# Patient Record
(demographics unavailable — no encounter records)

---

## 2024-10-19 NOTE — HISTORY OF PRESENT ILLNESS
[Denies] : Denies [No] : No [Yes] : Yes [Declined] : Declined [Informed consent documented in EHR.] : Informed consent documented in EHR. [Left upper extremity] : Left upper extremity [24g] : 24g [Start Time: ___] : Medication Start Time: [unfilled] [End Time: ___] : Medication End Time: [unfilled] [Medication Name: ___] : Medication Name: [unfilled] [Total Amount Administered: ___] : Total Amount Administered: [unfilled] [IV discontinued. Intact. No signs or symptoms of IV complications noted. Time: ___] : IV discontinued. Intact. No signs or symptoms of IV complications noted. Time: [unfilled] [Patient  instructed to seek medical attention with signs and symptoms of adverse effects] : Patient  instructed to seek medical attention with signs and symptoms of adverse effects [Patient left unit in no acute distress] : Patient left unit in no acute distress [Medications administered as ordered and tolerated well.] : Medications administered as ordered and tolerated well. [Blood drawn at time of visit] : Blood drawn at time of visit [de-identified] : Patient is here for scheduled Benlysta infusion. Patient states she tolerated last infusion well. Today, patient denies any recent infection/ abx use. She denies any swelling, rashes, soreness, pain.  No  complaints at this time. Pre med solucortef IV administered. Patient refused oral Tylenol and Zyrtec. Medication administered as prescribed. Patient tolerated infusion well, discharged to home in [facility].

## 2024-12-13 NOTE — HISTORY OF PRESENT ILLNESS
[Denies] : Denies [No] : No [Yes] : Yes [Declined] : Declined [Left upper extremity] : Left upper extremity [24g] : 24g [Start Time: ___] : Medication Start Time: [unfilled] [End Time: ___] : Medication End Time: [unfilled] [Medication Name: ___] : Medication Name: [unfilled] [Total Amount Administered: ___] : Total Amount Administered: [unfilled] [IV discontinued. Intact. No signs or symptoms of IV complications noted. Time: ___] : IV discontinued. Intact. No signs or symptoms of IV complications noted. Time: [unfilled] [Patient  instructed to seek medical attention with signs and symptoms of adverse effects] : Patient  instructed to seek medical attention with signs and symptoms of adverse effects [Patient left unit in no acute distress] : Patient left unit in no acute distress [Medications administered as ordered and tolerated well.] : Medications administered as ordered and tolerated well. [Blood drawn at time of visit] : Blood drawn at time of visit [de-identified] : left medial  [de-identified] : labs drawn as per order [de-identified] : Patient presents for Benlysta infusion, doing well overall. Patient denies hospitalizations or any recent infections. Patient reports having fatigue, other than that, patient denies pain, swelling, rashes, sores, and morning stiffness. Patient denies any mood changes or signs of depression. Patient refused Tylenol and Zyrtec, premedicated with Hydrocortisone only. Patient tolerated infusion well and left the unit in [place].

## 2024-12-23 NOTE — HISTORY OF PRESENT ILLNESS
[FreeTextEntry1] : Primary rheumatologist: Dr. Ramachandran.  MARIEL MACKAY presents for a follow up visit today.  last office visit 3/2024  ROS: painful ulcers in the nose no oral ulcers no hair loss  no joint pain, swelling or stiffness.   Prior history  SLE on Benlysta (LATOYA, dsDNA, SIGRID, hypocomplementemia)  The following labs reviewed CBC: Hb 10.7 stable; WBC 3.37 (intermittent, stable overall); plts wnl CMP wnl Prot/Cr: normal  dsDNA wnl UA negative

## 2024-12-23 NOTE — ASSESSMENT
[FreeTextEntry1] : HCM: declines influenza vaccine  labs are stable  continue with Benlysta infusions f/u with ENT to evaluate nasal ulcers saline gel to nares for now.   OV 3 months, sooner PRN   My collective time spent on today's visit was greater than 35 minutes and included: Preparation for the visit, review of the medical records, review of pertinent diagnostic studies, examination and counseling of the patient on the above diagnosis, treatment plan and prognosis, orders of diagnostic test, medications and or appropriate procedures and documentation in the medical record of today's visit.

## 2024-12-23 NOTE — PHYSICAL EXAM
[General Appearance - Alert] : alert [General Appearance - In No Acute Distress] : in no acute distress [Auscultation Breath Sounds / Voice Sounds] : lungs were clear to auscultation bilaterally [Heart Sounds] : normal S1 and S2 [Murmurs] : no murmurs [Full Pulse] : the pedal pulses are present [Edema] : there was no peripheral edema [] : no rash [Abnormal Walk] : normal gait [Nail Clubbing] : no clubbing  or cyanosis of the fingernails [Musculoskeletal - Swelling] : no joint swelling seen [Motor Tone] : muscle strength and tone were normal [FreeTextEntry1] : nasal ulcer, chronic

## 2025-01-17 NOTE — HISTORY OF PRESENT ILLNESS
[Denies] : Denies [No] : No [Yes] : Yes [Informed consent documented in EHR.] : Informed consent documented in EHR. [24g] : 24g [Start Time: ___] : Medication Start Time: [unfilled] [End Time: ___] : Medication End Time: [unfilled] [Medication Name: ___] : Medication Name: [unfilled] [Total Amount Administered: ___] : Total Amount Administered: [unfilled] [IV discontinued. Intact. No signs or symptoms of IV complications noted. Time: ___] : IV discontinued. Intact. No signs or symptoms of IV complications noted. Time: [unfilled] [Patient  instructed to seek medical attention with signs and symptoms of adverse effects] : Patient  instructed to seek medical attention with signs and symptoms of adverse effects [Patient left unit in no acute distress] : Patient left unit in no acute distress [Medications administered as ordered and tolerated well.] : Medications administered as ordered and tolerated well. [de-identified] : left arm median cubital vein  [de-identified] : no labs  [de-identified] : Patient presents for scheduled Benlysta infusion, ambulatory in [facility]. Today, patient continues to reports having fatigue, otherwise, reports doing well and denies pain, swelling, rashes, sores, and no joints stiffness. Patient denies any mood changes or signs of depression. Patient premedicated with Hydrocortisone only, and infusion tolerated well.

## 2025-03-28 NOTE — HISTORY OF PRESENT ILLNESS
[Denies] : Denies [No] : No [Yes] : Yes [Declined] : Declined [Left upper extremity] : Left upper extremity [24g] : 24g [Start Time: ___] : Medication Start Time: [unfilled] [End Time: ___] : Medication End Time: [unfilled] [Medication Name: ___] : Medication Name: [unfilled] [Total Amount Administered: ___] : Total Amount Administered: [unfilled] [IV discontinued. Intact. No signs or symptoms of IV complications noted. Time: ___] : IV discontinued. Intact. No signs or symptoms of IV complications noted. Time: [unfilled] [Patient  instructed to seek medical attention with signs and symptoms of adverse effects] : Patient  instructed to seek medical attention with signs and symptoms of adverse effects [Patient left unit in no acute distress] : Patient left unit in no acute distress [Medications administered as ordered and tolerated well.] : Medications administered as ordered and tolerated well. [Blood drawn at time of visit] : Blood drawn at time of visit [de-identified] : left medial [de-identified] : Labs drawn as per ordered by MD  [de-identified] : Patient presents for Benlysta infusion, doing well overall. Patient denies any recent infection, antibiotic use or hospitalizations. Patient denies any pain, swelling or stiffness. Patient denies any rashes or lesions to the skin. Patient reports experiencing a reoccurring scab in her nose, patient reports has to f/u with ENT. No other symptoms or concerns noted at this time. Patient pre-medicated and tolerated infusion well.

## 2025-04-22 NOTE — ASSESSMENT
[FreeTextEntry1] : Prior history  SLE on Benlysta (LATOYA, dsDNA, SIGRID, hypocomplementemia) MARIEL MACKAY presents for a follow up visit today.  son is almost 3 yrs old  ROS: painful ulcers in the nose no oral ulcers no hair loss  no joint pain, swelling or stiffness.  once per month bleeds - feels like she has a scratch inside was referred to ENT by Dr. Bowers a few months ago - did not go most recent SLE labs negative for activity Also c/o fatigue - doesn't sleep much, working 2 jobs >> chronic nasal ulcer w/ normal SLE labs needs ENT evaluation for possible ligation, other etiology or that ulcer maybe located over a small v. and is not actively inflamed but bleeds when gets too dry, eroded. labs ordered to monitor disease activity and for medication side effects.  Will notify of results continue benlysta patient is aware of and in agreement with assessment and plans  fuv 3 mo sooner for flare

## 2025-04-22 NOTE — PHYSICAL EXAM
[General Appearance - Alert] : alert [General Appearance - In No Acute Distress] : in no acute distress [FreeTextEntry1] : nasal excoriation, chronic [Auscultation Breath Sounds / Voice Sounds] : lungs were clear to auscultation bilaterally [Heart Sounds] : normal S1 and S2 [Murmurs] : no murmurs [Full Pulse] : the pedal pulses are present [Edema] : there was no peripheral edema [] : no rash [Abnormal Walk] : normal gait [Nail Clubbing] : no clubbing  or cyanosis of the fingernails [Musculoskeletal - Swelling] : no joint swelling seen [Motor Tone] : muscle strength and tone were normal

## 2025-04-25 NOTE — HISTORY OF PRESENT ILLNESS
[Denies] : Denies [No] : No [Yes] : Yes [Declined] : Declined [Left upper extremity] : Left upper extremity [24g] : 24g [Start Time: ___] : Medication Start Time: [unfilled] [End Time: ___] : Medication End Time: [unfilled] [Medication Name: ___] : Medication Name: [unfilled] [Total Amount Administered: ___] : Total Amount Administered: [unfilled] [IV discontinued. Intact. No signs or symptoms of IV complications noted. Time: ___] : IV discontinued. Intact. No signs or symptoms of IV complications noted. Time: [unfilled] [Patient  instructed to seek medical attention with signs and symptoms of adverse effects] : Patient  instructed to seek medical attention with signs and symptoms of adverse effects [Patient left unit in no acute distress] : Patient left unit in no acute distress [Medications administered as ordered and tolerated well.] : Medications administered as ordered and tolerated well. [Blood drawn at time of visit] : Blood drawn at time of visit [de-identified] : left medial [de-identified] : Labs drawn as per ordered by MD  [de-identified] : Patient presents for Benlysta infusion, ambulatory in [facility]. Patient denies any recent infections, ABX use or hospitalizations. Patient denies any pain, swelling or stiffness. Patient denies any rashes or lesions to the skin. Patient saw Dr. Ramachandran earlier this week for FUV. No other symptoms or concerns noted at this time. Patient pre-medicated and tolerated infusion well.  Patient ambulated off the unit in [facility] upon medication completion.

## 2025-05-23 NOTE — HISTORY OF PRESENT ILLNESS
[Denies] : Denies [No] : No [Yes] : Yes [Declined] : Declined [Left upper extremity] : Left upper extremity [24g] : 24g [Start Time: ___] : Medication Start Time: [unfilled] [End Time: ___] : Medication End Time: [unfilled] [Medication Name: ___] : Medication Name: [unfilled] [Total Amount Administered: ___] : Total Amount Administered: [unfilled] [IV discontinued. Intact. No signs or symptoms of IV complications noted. Time: ___] : IV discontinued. Intact. No signs or symptoms of IV complications noted. Time: [unfilled] [Patient  instructed to seek medical attention with signs and symptoms of adverse effects] : Patient  instructed to seek medical attention with signs and symptoms of adverse effects [Patient left unit in no acute distress] : Patient left unit in no acute distress [Medications administered as ordered and tolerated well.] : Medications administered as ordered and tolerated well. [Blood drawn at time of visit] : Blood drawn at time of visit [de-identified] : left medial [de-identified] : Labs drawn as per ordered by MD  [de-identified] : Patient presents for Benlysta infusion, ambulatory in [facility]. Patient denies any recent infections, ABX use or hospitalizations. Patient denies any pain, swelling or stiffness. Patient denies any rashes or lesions to the skin. No symptoms or concerns noted at this time. Patient pre-medicated and tolerated infusion well.

## 2025-07-29 NOTE — HISTORY OF PRESENT ILLNESS
[Denies] : Denies [No] : No [Yes] : Yes [Declined] : Declined [Left upper extremity] : Left upper extremity [24g] : 24g [Start Time: ___] : Medication Start Time: [unfilled] [End Time: ___] : Medication End Time: [unfilled] [Medication Name: ___] : Medication Name: [unfilled] [Total Amount Administered: ___] : Total Amount Administered: [unfilled] [IV discontinued. Intact. No signs or symptoms of IV complications noted. Time: ___] : IV discontinued. Intact. No signs or symptoms of IV complications noted. Time: [unfilled] [Patient  instructed to seek medical attention with signs and symptoms of adverse effects] : Patient  instructed to seek medical attention with signs and symptoms of adverse effects [Patient left unit in no acute distress] : Patient left unit in no acute distress [Medications administered as ordered and tolerated well.] : Medications administered as ordered and tolerated well. [Blood drawn at time of visit] : Blood drawn at time of visit [de-identified] : left medial [de-identified] : Labs drawn as per ordered by MD  [de-identified] : Patient presents for Benlysta infusion, ambulatory in [facility]. Patient denies any recent infections, ABX use or hospitalizations. Patient denies any pain, swelling or stiffness. Patient denies any rashes or lesions to the skin. No symptoms or concerns noted at this time. Patient pre-medicated and tolerated infusion well.